# Patient Record
Sex: MALE | Race: WHITE | NOT HISPANIC OR LATINO | URBAN - METROPOLITAN AREA
[De-identification: names, ages, dates, MRNs, and addresses within clinical notes are randomized per-mention and may not be internally consistent; named-entity substitution may affect disease eponyms.]

---

## 2023-04-17 ENCOUNTER — OFFICE (OUTPATIENT)
Dept: URBAN - METROPOLITAN AREA CLINIC 9 | Facility: CLINIC | Age: 68
End: 2023-04-17
Payer: COMMERCIAL

## 2023-04-17 VITALS
WEIGHT: 195 LBS | OXYGEN SATURATION: 97 % | HEIGHT: 72 IN | DIASTOLIC BLOOD PRESSURE: 81 MMHG | HEART RATE: 66 BPM | SYSTOLIC BLOOD PRESSURE: 130 MMHG

## 2023-04-17 DIAGNOSIS — K21.9 GASTRO-ESOPHAGEAL REFLUX DISEASE WITHOUT ESOPHAGITIS: ICD-10-CM

## 2023-04-17 PROCEDURE — 99203 OFFICE O/P NEW LOW 30 MIN: CPT | Performed by: INTERNAL MEDICINE

## 2023-04-17 RX ORDER — OMEPRAZOLE 40 MG/1
CAPSULE, DELAYED RELEASE ORAL
Qty: 90 | Refills: 4 | Status: ACTIVE
Start: 2023-04-17

## 2023-04-17 NOTE — SERVICEHPINOTES
The patient noted   3  months   ago   the onset of   moderate  dull  right upper quadrant  pain   that radiated to the   epigastrium   and that lasted   3  weeks  .  Pain usually starts   intermittently with GERD  .  It was triggered by   ?  and relieved with   ?  .  Since then similar episodes have occurred    times   per    and lasted   .  The subsequent episodes were triggered by   and relieved by   .  The patient was previously treated with  none  . The current treatment includes   none

## 2023-06-02 ENCOUNTER — AMBULATORY SURGICAL CENTER (OUTPATIENT)
Dept: URBAN - METROPOLITAN AREA SURGERY 2 | Facility: SURGERY | Age: 68
End: 2023-06-02
Payer: COMMERCIAL

## 2023-06-02 ENCOUNTER — OFFICE (OUTPATIENT)
Dept: URBAN - METROPOLITAN AREA PATHOLOGY 12 | Facility: PATHOLOGY | Age: 68
End: 2023-06-02
Payer: MEDICARE

## 2023-06-02 VITALS
SYSTOLIC BLOOD PRESSURE: 106 MMHG | TEMPERATURE: 97.8 F | HEART RATE: 63 BPM | SYSTOLIC BLOOD PRESSURE: 101 MMHG | OXYGEN SATURATION: 95 % | SYSTOLIC BLOOD PRESSURE: 106 MMHG | OXYGEN SATURATION: 96 % | HEART RATE: 63 BPM | SYSTOLIC BLOOD PRESSURE: 108 MMHG | HEART RATE: 71 BPM | RESPIRATION RATE: 16 BRPM | SYSTOLIC BLOOD PRESSURE: 101 MMHG | DIASTOLIC BLOOD PRESSURE: 59 MMHG | OXYGEN SATURATION: 95 % | TEMPERATURE: 97.7 F | HEART RATE: 62 BPM | HEART RATE: 63 BPM | SYSTOLIC BLOOD PRESSURE: 110 MMHG | HEIGHT: 72 IN | OXYGEN SATURATION: 93 % | DIASTOLIC BLOOD PRESSURE: 68 MMHG | TEMPERATURE: 97.8 F | HEART RATE: 70 BPM | SYSTOLIC BLOOD PRESSURE: 128 MMHG | HEART RATE: 69 BPM | OXYGEN SATURATION: 96 % | SYSTOLIC BLOOD PRESSURE: 108 MMHG | SYSTOLIC BLOOD PRESSURE: 128 MMHG | DIASTOLIC BLOOD PRESSURE: 76 MMHG | DIASTOLIC BLOOD PRESSURE: 50 MMHG | TEMPERATURE: 97.7 F | DIASTOLIC BLOOD PRESSURE: 76 MMHG | WEIGHT: 185 LBS | WEIGHT: 185 LBS | OXYGEN SATURATION: 94 % | SYSTOLIC BLOOD PRESSURE: 128 MMHG | HEART RATE: 69 BPM | SYSTOLIC BLOOD PRESSURE: 108 MMHG | DIASTOLIC BLOOD PRESSURE: 58 MMHG | RESPIRATION RATE: 16 BRPM | DIASTOLIC BLOOD PRESSURE: 94 MMHG | TEMPERATURE: 97.7 F | DIASTOLIC BLOOD PRESSURE: 58 MMHG | DIASTOLIC BLOOD PRESSURE: 59 MMHG | HEIGHT: 72 IN | SYSTOLIC BLOOD PRESSURE: 161 MMHG | DIASTOLIC BLOOD PRESSURE: 76 MMHG | SYSTOLIC BLOOD PRESSURE: 161 MMHG | OXYGEN SATURATION: 93 % | SYSTOLIC BLOOD PRESSURE: 106 MMHG | HEART RATE: 71 BPM | SYSTOLIC BLOOD PRESSURE: 110 MMHG | DIASTOLIC BLOOD PRESSURE: 68 MMHG | SYSTOLIC BLOOD PRESSURE: 101 MMHG | DIASTOLIC BLOOD PRESSURE: 58 MMHG | WEIGHT: 185 LBS | HEART RATE: 71 BPM | OXYGEN SATURATION: 96 % | SYSTOLIC BLOOD PRESSURE: 161 MMHG | HEART RATE: 70 BPM | DIASTOLIC BLOOD PRESSURE: 94 MMHG | DIASTOLIC BLOOD PRESSURE: 59 MMHG | OXYGEN SATURATION: 94 % | HEART RATE: 70 BPM | DIASTOLIC BLOOD PRESSURE: 50 MMHG | DIASTOLIC BLOOD PRESSURE: 68 MMHG | RESPIRATION RATE: 16 BRPM | TEMPERATURE: 97.8 F | DIASTOLIC BLOOD PRESSURE: 94 MMHG | OXYGEN SATURATION: 95 % | DIASTOLIC BLOOD PRESSURE: 50 MMHG | HEART RATE: 69 BPM | OXYGEN SATURATION: 93 % | SYSTOLIC BLOOD PRESSURE: 110 MMHG | OXYGEN SATURATION: 94 % | HEART RATE: 62 BPM | HEART RATE: 62 BPM | HEIGHT: 72 IN

## 2023-06-02 DIAGNOSIS — K21.00 GASTRO-ESOPHAGEAL REFLUX DISEASE WITH ESOPHAGITIS, WITHOUT B: ICD-10-CM

## 2023-06-02 DIAGNOSIS — D12.2 BENIGN NEOPLASM OF ASCENDING COLON: ICD-10-CM

## 2023-06-02 DIAGNOSIS — K64.2 THIRD DEGREE HEMORRHOIDS: ICD-10-CM

## 2023-06-02 DIAGNOSIS — Z12.11 ENCOUNTER FOR SCREENING FOR MALIGNANT NEOPLASM OF COLON: ICD-10-CM

## 2023-06-02 DIAGNOSIS — K29.70 GASTRITIS, UNSPECIFIED, WITHOUT BLEEDING: ICD-10-CM

## 2023-06-02 DIAGNOSIS — K57.30 DIVERTICULOSIS OF LARGE INTESTINE WITHOUT PERFORATION OR ABS: ICD-10-CM

## 2023-06-02 DIAGNOSIS — K64.1 SECOND DEGREE HEMORRHOIDS: ICD-10-CM

## 2023-06-02 DIAGNOSIS — K31.89 OTHER DISEASES OF STOMACH AND DUODENUM: ICD-10-CM

## 2023-06-02 PROBLEM — K20.80 OTHER ESOPHAGITIS WITHOUT BLEEDING: Status: ACTIVE | Noted: 2023-06-02

## 2023-06-02 PROBLEM — K63.5 POLYP OF COLON: Status: ACTIVE | Noted: 2023-06-02

## 2023-06-02 PROBLEM — K20.90 ESOPHAGITIS, UNSPECIFIED WITHOUT BLEEDING: Status: ACTIVE | Noted: 2023-06-02

## 2023-06-02 PROCEDURE — 45385 COLONOSCOPY W/LESION REMOVAL: CPT | Mod: PT | Performed by: INTERNAL MEDICINE

## 2023-06-02 PROCEDURE — 43239 EGD BIOPSY SINGLE/MULTIPLE: CPT | Mod: 51 | Performed by: INTERNAL MEDICINE

## 2023-06-02 PROCEDURE — 88305 TISSUE EXAM BY PATHOLOGIST: CPT

## 2023-06-02 RX ADMIN — PROPOFOL 300 MG: 10 INJECTION, EMULSION INTRAVENOUS at 10:28
